# Patient Record
Sex: MALE | Race: WHITE | Employment: STUDENT | URBAN - METROPOLITAN AREA
[De-identification: names, ages, dates, MRNs, and addresses within clinical notes are randomized per-mention and may not be internally consistent; named-entity substitution may affect disease eponyms.]

---

## 2023-06-08 ENCOUNTER — TELEPHONE (OUTPATIENT)
Dept: ENT CLINIC | Age: 18
End: 2023-06-08

## 2023-06-08 NOTE — TELEPHONE ENCOUNTER
Left pt a voicemail regarding his missed appt with Dr. Libia Collins this afternoon and asked him to please call if he needs to reschedule.